# Patient Record
Sex: MALE | Race: WHITE | NOT HISPANIC OR LATINO | URBAN - METROPOLITAN AREA
[De-identification: names, ages, dates, MRNs, and addresses within clinical notes are randomized per-mention and may not be internally consistent; named-entity substitution may affect disease eponyms.]

---

## 2017-06-25 ENCOUNTER — EMERGENCY (EMERGENCY)
Facility: HOSPITAL | Age: 57
LOS: 1 days | Discharge: HOME | End: 2017-06-25
Attending: EMERGENCY MEDICINE | Admitting: EMERGENCY MEDICINE
Payer: COMMERCIAL

## 2017-06-25 VITALS
SYSTOLIC BLOOD PRESSURE: 154 MMHG | DIASTOLIC BLOOD PRESSURE: 85 MMHG | RESPIRATION RATE: 16 BRPM | HEART RATE: 65 BPM | TEMPERATURE: 98 F | OXYGEN SATURATION: 394 %

## 2017-06-25 VITALS
DIASTOLIC BLOOD PRESSURE: 89 MMHG | SYSTOLIC BLOOD PRESSURE: 135 MMHG | HEIGHT: 64 IN | HEART RATE: 72 BPM | TEMPERATURE: 98 F | WEIGHT: 130.07 LBS | RESPIRATION RATE: 15 BRPM | OXYGEN SATURATION: 97 %

## 2017-06-25 DIAGNOSIS — I10 ESSENTIAL (PRIMARY) HYPERTENSION: ICD-10-CM

## 2017-06-25 DIAGNOSIS — S81.811A LACERATION WITHOUT FOREIGN BODY, RIGHT LOWER LEG, INITIAL ENCOUNTER: ICD-10-CM

## 2017-06-25 DIAGNOSIS — E78.5 HYPERLIPIDEMIA, UNSPECIFIED: ICD-10-CM

## 2017-06-25 DIAGNOSIS — Z88.2 ALLERGY STATUS TO SULFONAMIDES: ICD-10-CM

## 2017-06-25 DIAGNOSIS — E11.9 TYPE 2 DIABETES MELLITUS WITHOUT COMPLICATIONS: ICD-10-CM

## 2017-06-25 DIAGNOSIS — Z88.1 ALLERGY STATUS TO OTHER ANTIBIOTIC AGENTS STATUS: ICD-10-CM

## 2017-06-25 PROCEDURE — 12002 RPR S/N/AX/GEN/TRNK2.6-7.5CM: CPT

## 2017-06-25 PROCEDURE — 12002 RPR S/N/AX/GEN/TRNK2.6-7.5CM: CPT | Mod: RT

## 2017-06-25 PROCEDURE — 99284 EMERGENCY DEPT VISIT MOD MDM: CPT | Mod: 25

## 2017-06-25 PROCEDURE — 99283 EMERGENCY DEPT VISIT LOW MDM: CPT | Mod: 25

## 2017-06-25 RX ORDER — CEPHALEXIN 500 MG
500 CAPSULE ORAL ONCE
Qty: 0 | Refills: 0 | Status: COMPLETED | OUTPATIENT
Start: 2017-06-25 | End: 2017-06-25

## 2017-06-25 RX ORDER — CEPHALEXIN 500 MG
1 CAPSULE ORAL
Qty: 14 | Refills: 0 | OUTPATIENT
Start: 2017-06-25 | End: 2017-07-02

## 2017-06-25 RX ADMIN — Medication 500 MILLIGRAM(S): at 14:33

## 2017-06-25 NOTE — ED PROVIDER NOTE - PSH
Glaucoma  s/p trabeculectomy  S/P cataract surgery  bilateral  S/P spinal fusion    Umbilical hernia  s/p repair

## 2017-06-25 NOTE — ED ADULT NURSE NOTE - PMH
Colitis    DDD (degenerative disc disease)    Diabetes    GERD (gastroesophageal reflux disease)    Glaucoma    Gout    HTN (hypertension)    Hyperlipidemia    Osteomyelitis  right mandible  Osteoporosis    Psoriatic arthritis    Renal stone

## 2017-06-25 NOTE — ED PROVIDER NOTE - MEDICAL DECISION MAKING DETAILS
tetanus UTD as per pt, wound irrigated with povidone iodine and ns, lac repair  Please follow up with your Primary MD in 24-48 hr. Keflex twice a day x 7 days. Keep area clean and dry, daily dressing changes, apply bacitracin. Watch for local signs of infection such as redness, warmth, swelling, pain, discharge. Return to ED immediately if condition worsens or development of nausea, vomiting, fever, chills, diarrhea. Follow up with your primary care provider in 8-10 days for suture removal.   Seek immediate medical care for any new/worsening signs or symptoms.

## 2017-06-25 NOTE — ED PROVIDER NOTE - ATTENDING CONTRIBUTION TO CARE
Pt is a 56 yo male who presents to the ED with a cc of laceration.  Pt has thin skin and suffers from frequent lacerations.  Reports that today he was on his boat and his right lower leg banged against a cooler.  Pt reports that he sustained a laceration to his leg.  Bleeding is controlled.  Pt is ambulatory without pain.  Tetanus is UTD.  Denies numbness or tingling in ext.  On physical exam pt resting comfortably in bed NAD.  Right lateral calf linear laceration noted approx 9 cm in length.  Bleeding controlled.  No evidence of superimposed infection, sensation intact +pedal pulse.  Pt does have bilateral peripheral edema.  This is chronic per pt reports and he is currently on Lasix, and has SCDs at home. Agree with above plan of care

## 2017-06-25 NOTE — ED PROVIDER NOTE - PHYSICAL EXAMINATION
Extremities- No bony tenderness of lower extremities BL. FROM hips, knees, ankles. NVI, good distal pulses x 4 extremities, capillary refill <2 sec x 4 extremities, sensation intact throughout, 5/5 motor x 4 extremities. Bleeding controlled in ED. No bruising, no redness, no warmth, no active discharge, no tenderness.

## 2017-06-25 NOTE — ED PROVIDER NOTE - OBJECTIVE STATEMENT
58 y/o male presents to ED c/o lac to right calf x 1 hour. States he accidentally hit his leg against a cooler. Denies any other complaints. States he otherwise feels good. Denies n/v, f/c, chest pain, sob, numbness, tingling.

## 2017-08-10 ENCOUNTER — EMERGENCY (EMERGENCY)
Facility: HOSPITAL | Age: 57
LOS: 1 days | Discharge: HOME | End: 2017-08-10
Attending: EMERGENCY MEDICINE | Admitting: EMERGENCY MEDICINE
Payer: COMMERCIAL

## 2017-08-10 VITALS
SYSTOLIC BLOOD PRESSURE: 150 MMHG | TEMPERATURE: 98 F | RESPIRATION RATE: 16 BRPM | DIASTOLIC BLOOD PRESSURE: 82 MMHG | HEART RATE: 92 BPM | OXYGEN SATURATION: 97 %

## 2017-08-10 VITALS
OXYGEN SATURATION: 95 % | HEIGHT: 64 IN | DIASTOLIC BLOOD PRESSURE: 83 MMHG | RESPIRATION RATE: 18 BRPM | SYSTOLIC BLOOD PRESSURE: 134 MMHG | HEART RATE: 95 BPM | TEMPERATURE: 98 F | WEIGHT: 130.07 LBS

## 2017-08-10 DIAGNOSIS — S41.112A LACERATION WITHOUT FOREIGN BODY OF LEFT UPPER ARM, INITIAL ENCOUNTER: ICD-10-CM

## 2017-08-10 DIAGNOSIS — I10 ESSENTIAL (PRIMARY) HYPERTENSION: ICD-10-CM
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
## 2017-08-10 DIAGNOSIS — R07.81 PLEURODYNIA: ICD-10-CM

## 2017-08-10 DIAGNOSIS — L40.50 ARTHROPATHIC PSORIASIS, UNSPECIFIED: ICD-10-CM
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
## 2017-08-10 DIAGNOSIS — K21.9 GASTRO-ESOPHAGEAL REFLUX DISEASE WITHOUT ESOPHAGITIS: ICD-10-CM

## 2017-08-10 DIAGNOSIS — Z79.84 LONG TERM (CURRENT) USE OF ORAL HYPOGLYCEMIC DRUGS: ICD-10-CM

## 2017-08-10 DIAGNOSIS — Z98.1 ARTHRODESIS STATUS: ICD-10-CM

## 2017-08-10 DIAGNOSIS — E11.9 TYPE 2 DIABETES MELLITUS WITHOUT COMPLICATIONS: ICD-10-CM

## 2017-08-10 PROCEDURE — 99284 EMERGENCY DEPT VISIT MOD MDM: CPT | Mod: 25

## 2017-08-10 PROCEDURE — 12005 RPR S/N/A/GEN/TRK12.6-20.0CM: CPT

## 2017-08-10 PROCEDURE — 96372 THER/PROPH/DIAG INJ SC/IM: CPT | Mod: XU

## 2017-08-10 PROCEDURE — 71101 X-RAY EXAM UNILAT RIBS/CHEST: CPT

## 2017-08-10 PROCEDURE — 71101 X-RAY EXAM UNILAT RIBS/CHEST: CPT | Mod: 26

## 2017-08-10 RX ORDER — CEPHALEXIN 500 MG
500 CAPSULE ORAL ONCE
Qty: 0 | Refills: 0 | Status: DISCONTINUED | OUTPATIENT
Start: 2017-08-10 | End: 2017-08-14

## 2017-08-10 RX ORDER — CEPHALEXIN 500 MG
1 CAPSULE ORAL
Qty: 28 | Refills: 0 | OUTPATIENT
Start: 2017-08-10 | End: 2017-08-17

## 2017-08-10 RX ORDER — HYDROMORPHONE HYDROCHLORIDE 2 MG/ML
0.5 INJECTION INTRAMUSCULAR; INTRAVENOUS; SUBCUTANEOUS ONCE
Qty: 0 | Refills: 0 | Status: DISCONTINUED | OUTPATIENT
Start: 2017-08-10 | End: 2017-08-10

## 2017-08-10 RX ADMIN — HYDROMORPHONE HYDROCHLORIDE 0.5 MILLIGRAM(S): 2 INJECTION INTRAMUSCULAR; INTRAVENOUS; SUBCUTANEOUS at 18:48

## 2017-08-10 RX ADMIN — HYDROMORPHONE HYDROCHLORIDE 0.5 MILLIGRAM(S): 2 INJECTION INTRAMUSCULAR; INTRAVENOUS; SUBCUTANEOUS at 19:22

## 2017-08-10 NOTE — ED PROVIDER NOTE - OBJECTIVE STATEMENT
56 y/o M with h/o psoriatic arthritis on prednisone presents s/p fall today.  Tripped getting out of his car.  Struck his L arm and L rib cage on the ground.  Did not hit his head. No LOC.  Able to get back up and ambulate.  Presents now c/o L sided rib pain and L arm skin tears.  Denies SOB, neck/back pain, or any other complaints.

## 2017-08-10 NOTE — ED ADULT NURSE NOTE - OBJECTIVE STATEMENT
c/o fall getting out of car. trip/fall  c/o pain left rib  > on inspiration.  skin tears left upper arm  left elbow  left forearm and left knee.

## 2017-08-21 ENCOUNTER — EMERGENCY (EMERGENCY)
Facility: HOSPITAL | Age: 57
LOS: 1 days | Discharge: HOME | End: 2017-08-21
Attending: EMERGENCY MEDICINE | Admitting: EMERGENCY MEDICINE
Payer: COMMERCIAL

## 2017-08-21 VITALS
HEART RATE: 97 BPM | HEIGHT: 64 IN | TEMPERATURE: 99 F | WEIGHT: 125 LBS | SYSTOLIC BLOOD PRESSURE: 132 MMHG | DIASTOLIC BLOOD PRESSURE: 79 MMHG | RESPIRATION RATE: 16 BRPM | OXYGEN SATURATION: 96 %

## 2017-08-21 DIAGNOSIS — Z48.02 ENCOUNTER FOR REMOVAL OF SUTURES: ICD-10-CM

## 2017-08-21 PROCEDURE — G0463: CPT

## 2017-08-21 NOTE — ED PROVIDER NOTE - PLAN OF CARE
Return to the ED for any new or worsening symptoms including but not limited to drainage from site  Apply Bactroban 2 times a day to affected sites   Keep wounds clean and dry cover when outside   Follow up in 2 days for a recheck to see if remaining sutures can be removed   Advance activity as tolerated

## 2017-08-21 NOTE — ED PROVIDER NOTE - OBJECTIVE STATEMENT
Pt is a 58 yo male who presents to the ED for suture removal.  PMHx of colitis, DDD, DM, GERD, glaucoma, gout, HTN, HLD, osteomyelitis, right mandible osteoporosis, psoriatic arthritis on chronic prednisone, h/o renal stones.  Pt was seen in the ED on August 10th after he suffered a mechanical fall getting out of his vehicle and sustained multiple skin tears to his left arm.  Pt required several sutures to be placed in the skin tear.  He was sent home on po keflex.  Pt reports that he has been recovering well.  Denies fever, chills, N/V, CP, SOB, abd pain.  Pt denies drainage from the site.  His Tetanus is UTD.

## 2017-08-21 NOTE — ED PROVIDER NOTE - SKIN, MLM
Pt with multiple skin teats to left arm extending above and below elbow measuring approx 20 cm.  Ecchymosis noted to wound with mild redness, no drainage, no increased warmth, sensation intact +radial pulse pt with thin skin noted secondary to chronic steroid use

## 2017-08-21 NOTE — ED ADULT NURSE NOTE - OBJECTIVE STATEMENT
58 yo male received ambulatory, alert and oriented x 3, here for suture removal. Pt stated he is here to have stitches removed from 3 laceration to Left arm. Pt denies any pain or discomfort. Site assessed, appears clean, dry, intact, no drainage or bleeding. No acute s/s of distress.

## 2017-08-21 NOTE — ED PROVIDER NOTE - PROGRESS NOTE DETAILS
2 sutures left in left upper arm and 1 suture  left proximal forearm.  Mild dehiscence noted.  Steri strips placed.  Pt will continue Keflex.  Will return to have remainder of the sutures removed. Pt understands all questions answered

## 2017-08-21 NOTE — ED PROVIDER NOTE - CARE PLAN
Principal Discharge DX:	Suture check  Instructions for follow-up, activity and diet:	Return to the ED for any new or worsening symptoms including but not limited to drainage from site  Apply Bactroban 2 times a day to affected sites   Keep wounds clean and dry cover when outside   Follow up in 2 days for a recheck to see if remaining sutures can be removed   Advance activity as tolerated

## 2017-08-25 ENCOUNTER — EMERGENCY (EMERGENCY)
Facility: HOSPITAL | Age: 57
LOS: 1 days | Discharge: HOME | End: 2017-08-25
Attending: EMERGENCY MEDICINE | Admitting: EMERGENCY MEDICINE
Payer: COMMERCIAL

## 2017-08-25 VITALS
DIASTOLIC BLOOD PRESSURE: 84 MMHG | HEIGHT: 64 IN | RESPIRATION RATE: 16 BRPM | OXYGEN SATURATION: 96 % | TEMPERATURE: 98 F | HEART RATE: 112 BPM | WEIGHT: 125 LBS | SYSTOLIC BLOOD PRESSURE: 131 MMHG

## 2017-08-25 DIAGNOSIS — Z48.02 ENCOUNTER FOR REMOVAL OF SUTURES: ICD-10-CM

## 2017-08-25 PROCEDURE — G0463: CPT

## 2017-08-25 RX ORDER — ALLOPURINOL 300 MG
0 TABLET ORAL
Qty: 0 | Refills: 0 | COMMUNITY

## 2017-08-25 RX ORDER — PANTOPRAZOLE SODIUM 20 MG/1
0 TABLET, DELAYED RELEASE ORAL
Qty: 0 | Refills: 0 | COMMUNITY

## 2017-08-25 RX ORDER — MUPIROCIN 20 MG/G
1 OINTMENT TOPICAL ONCE
Qty: 0 | Refills: 0 | Status: DISCONTINUED | OUTPATIENT
Start: 2017-08-25 | End: 2017-08-25

## 2017-08-25 RX ORDER — FENTANYL CITRATE 50 UG/ML
1 INJECTION INTRAVENOUS
Qty: 0 | Refills: 0 | COMMUNITY

## 2017-08-25 RX ORDER — METFORMIN HYDROCHLORIDE 850 MG/1
1 TABLET ORAL
Qty: 0 | Refills: 0 | COMMUNITY

## 2017-08-25 NOTE — ED PROVIDER NOTE - PHYSICAL EXAMINATION
2 sutures, clean dry and intact on left upper arm, and one suture left proximal forearm clean dry and intact, no signs of cellulitis

## 2021-03-12 NOTE — ED ADULT NURSE NOTE - CCCP TRG CHIEF CMPLNT
Addended by: COLIN SANTOS on: 3/12/2021 08:27 AM     Modules accepted: Orders     suture/staple removal

## 2022-02-28 NOTE — ED PROCEDURE NOTE - CPROC ED INFORMED CONSENT1
Benefits, risks, and possible complications of procedure explained to patient/caregiver who verbalized understanding and gave verbal consent. Biopsy Photograph Reviewed: Yes

## 2024-01-24 NOTE — ED PROVIDER NOTE - NS_EDPROVIDERDISPOUSERTYPE_ED_A_ED
Obtain the CT head and thoracic spine discs and reports from [facility].    Neurosurgery office to call once imaging is reviewed.     Follow up with neurosurgery office as needed    Attending Attestation (For Attendings USE Only)...

## 2024-03-27 NOTE — ED ADULT NURSE NOTE - OBJECTIVE STATEMENT
no wheezing/no dyspnea/no cough/no hemoptysis/no pleuritic chest pain patient with right lower leg lacerations after banging it

## 2024-10-23 NOTE — ED ADULT NURSE NOTE - NS ED NURSE DC INFO COMPLEXITY
0 (no pain/absence of nonverbal indicators of pain)
Simple: Patient demonstrates quick and easy understanding

## 2025-02-14 NOTE — ED PROVIDER NOTE - VASCULAR COMPROMISE
Compensated.  Continue current management.     - C/b hepatic encephalopathy and esophageal varices  - Paracentesis done today; 1.4L removed  - Pt appears clinically euvolemic on exam    Plan:  - Continue on Lasix 40mg and Spironolactone 100mg qD  - Continue on lactulose and rifaximin  - Low current suspicion for SBP; pt taking IV CTX 2g qD for bacteremia  - fu Transplant hepatology recs       > PETH level, Type and Screen, Would repeat TAJ and ASMA, IgG levels, Microsomal Ab  - fu abdominal US for ascites - mild-mod ascites       > Paracentesis tentatively planned with IR 2/13       > Fu cell count       > Negative for SBP no vascular compromise/pulses full and equal bilaterally

## 2025-04-18 NOTE — ED ADULT TRIAGE NOTE - NS ED TRIAGE AVPU SCALE
Alert-The patient is alert, awake and responds to voice. The patient is oriented to time, place, and person. The triage nurse is able to obtain subjective information. 18-Apr-2025 15:52